# Patient Record
Sex: MALE | URBAN - METROPOLITAN AREA
[De-identification: names, ages, dates, MRNs, and addresses within clinical notes are randomized per-mention and may not be internally consistent; named-entity substitution may affect disease eponyms.]

---

## 2020-10-23 ENCOUNTER — OFFICE VISIT (OUTPATIENT)
Dept: URBAN - METROPOLITAN AREA TELEHEALTH 2 | Facility: TELEHEALTH | Age: 30
End: 2020-10-23
Payer: SELF-PAY

## 2020-10-23 DIAGNOSIS — K90.9 STEATORRHEA: ICD-10-CM

## 2020-10-23 DIAGNOSIS — R19.5 LOOSE STOOLS: ICD-10-CM

## 2020-10-23 DIAGNOSIS — M48.17: ICD-10-CM

## 2020-10-23 DIAGNOSIS — R19.4 CHANGE IN BOWEL HABIT: ICD-10-CM

## 2020-10-23 PROBLEM — 66187002 STEATORRHEA: Status: ACTIVE | Noted: 2020-10-23

## 2020-10-23 PROBLEM — 31487001 DISSEMINATED IDIOPATHIC SKELETAL HYPEROSTOSIS: Status: ACTIVE | Noted: 2020-10-23

## 2020-10-23 PROCEDURE — G8420 CALC BMI NORM PARAMETERS: HCPCS | Performed by: INTERNAL MEDICINE

## 2020-10-23 PROCEDURE — G9903 PT SCRN TBCO ID AS NON USER: HCPCS | Performed by: INTERNAL MEDICINE

## 2020-10-23 PROCEDURE — 1036F TOBACCO NON-USER: CPT | Performed by: INTERNAL MEDICINE

## 2020-10-23 PROCEDURE — G8427 DOCREV CUR MEDS BY ELIG CLIN: HCPCS | Performed by: INTERNAL MEDICINE

## 2020-10-23 PROCEDURE — 99204 OFFICE O/P NEW MOD 45 MIN: CPT | Performed by: INTERNAL MEDICINE

## 2020-10-23 PROCEDURE — G8482 FLU IMMUNIZE ORDER/ADMIN: HCPCS | Performed by: INTERNAL MEDICINE

## 2020-10-23 RX ORDER — SODIUM, POTASSIUM,MAG SULFATES 17.5-3.13G
354 ML SOLUTION, RECONSTITUTED, ORAL ORAL
Qty: 1 | Refills: 0 | OUTPATIENT
Start: 2020-10-23

## 2020-10-23 NOTE — HPI-TODAY'S VISIT:
He was diagnosed with axial spondyloarthritis. He is Naproxen for this. His Rheumatologist recommended that he undergo a colonoscopy as he has had some recent changes in his bowel habits.   He denies any rectal bleeding. He describes his stools as "greasy appearing." His stools are loose, and he tends to have 1-2 per day. He has not had constipation, bloating, rectal pain, anorexia or unintentional weight loss. He admits to intermittent lower abdominal discomfort but no pain.   Regarding any upper GI complaints, the patient has not had heartburn, nausea, vomiting or dysphagia.   The patient does not take blood thinners.  They deny any CP or FERNANDEZ.  The patient has never had a colonoscopy previously. There is no FH of colon cancer or colon polyps. His sister has CD. His parents both have RA.

## 2021-02-24 ENCOUNTER — TELEPHONE ENCOUNTER (OUTPATIENT)
Dept: URBAN - METROPOLITAN AREA CLINIC 78 | Facility: CLINIC | Age: 31
End: 2021-02-24

## 2021-02-26 ENCOUNTER — DASHBOARD ENCOUNTERS (OUTPATIENT)
Age: 31
End: 2021-02-26